# Patient Record
Sex: FEMALE | Race: OTHER | URBAN - METROPOLITAN AREA
[De-identification: names, ages, dates, MRNs, and addresses within clinical notes are randomized per-mention and may not be internally consistent; named-entity substitution may affect disease eponyms.]

---

## 2017-02-21 ENCOUNTER — TELEPHONE (OUTPATIENT)
Dept: OBGYN CLINIC | Facility: CLINIC | Age: 22
End: 2017-02-21

## 2017-02-21 NOTE — TELEPHONE ENCOUNTER
Pt saw Fritz Lala on 8/15/16 for annual exam and was given refill of OCP Loestrin. Pt calling today to report that YESIKA briefly discussed IUDs with pt at time of annual exam appt and pt stated she is interesting in getting IUD placed.  Pt stated that she cannot rem

## 2017-02-21 NOTE — TELEPHONE ENCOUNTER
W/LIKE IUD;AS DISCUSSED AT LAST LEONOR'T  MENSES DO THIS WEEK, PT ON BC  CAN ONLY DO EARLY MORNING  OK TO LV VM

## 2017-02-21 NOTE — TELEPHONE ENCOUNTER
Need to review all options of IUD -- new one available now also.  Come in for 10 min discussion first since over 6 months ago since last discussion

## 2017-02-21 NOTE — TELEPHONE ENCOUNTER
Called pt back to ask when she is due for her next period. Pt stated she is due Thursday or Friday of this week and pt stated that lately her periods are short, about 2 days. Message to [address] to please review message below and give recs.

## 2017-03-04 ENCOUNTER — OFFICE VISIT (OUTPATIENT)
Dept: OBGYN CLINIC | Facility: CLINIC | Age: 22
End: 2017-03-04

## 2017-03-04 ENCOUNTER — TELEPHONE (OUTPATIENT)
Dept: OBGYN CLINIC | Facility: CLINIC | Age: 22
End: 2017-03-04

## 2017-03-04 VITALS
BODY MASS INDEX: 30 KG/M2 | SYSTOLIC BLOOD PRESSURE: 121 MMHG | WEIGHT: 170 LBS | DIASTOLIC BLOOD PRESSURE: 81 MMHG | HEART RATE: 74 BPM

## 2017-03-04 DIAGNOSIS — Z30.09 BIRTH CONTROL COUNSELING: Primary | ICD-10-CM

## 2017-03-04 DIAGNOSIS — Z30.430 ENCOUNTER FOR INSERTION OF INTRAUTERINE CONTRACEPTIVE DEVICE: Primary | ICD-10-CM

## 2017-03-04 PROCEDURE — 99213 OFFICE O/P EST LOW 20 MIN: CPT | Performed by: OBSTETRICS & GYNECOLOGY

## 2017-03-04 NOTE — PROGRESS NOTES
Cheyenne Goldsmith is a 24year old female [facility] Patient's last menstrual period was 02/23/2017. Patient presents with:  Consult: IUD Consult -- had issues w/ insurance re: ocp coverage thus wants IUD instead.  Less cramps & lighter periods w/ ocps but put ma developed, well nourished  Head/Face: normocephalic  Psychiatric:  Oriented to time, place, person and situation. Appropriate mood and affect    Assessment & Plan:  Kt Licona was seen today for consult.     Diagnoses and all orders for this visit:    Birth con

## 2017-03-19 ENCOUNTER — TELEPHONE (OUTPATIENT)
Dept: OBGYN CLINIC | Facility: CLINIC | Age: 22
End: 2017-03-19

## 2017-03-19 RX ORDER — MISOPROSTOL 200 UG/1
TABLET ORAL
Qty: 2 TABLET | Refills: 0 | Status: SHIPPED | OUTPATIENT
Start: 2017-03-19

## 2017-03-19 NOTE — TELEPHONE ENCOUNTER
Pt called to due to she is to have IUD inserted tomorrow evening and there is no cytotec at pharmacy for her, erx sent.

## 2017-03-20 ENCOUNTER — OFFICE VISIT (OUTPATIENT)
Dept: OBGYN CLINIC | Facility: CLINIC | Age: 22
End: 2017-03-20

## 2017-03-20 VITALS — SYSTOLIC BLOOD PRESSURE: 119 MMHG | DIASTOLIC BLOOD PRESSURE: 78 MMHG | HEART RATE: 69 BPM

## 2017-03-20 DIAGNOSIS — Z30.430 ENCOUNTER FOR INSERTION OF INTRAUTERINE CONTRACEPTIVE DEVICE: Primary | ICD-10-CM

## 2017-03-20 LAB
CONTROL LINE PRESENT WITH A CLEAR BACKGROUND (YES/NO): YES YES/NO
KIT LOT #: NORMAL NUMERIC
PREGNANCY TEST, URINE: NEGATIVE

## 2017-03-20 PROCEDURE — 81025 URINE PREGNANCY TEST: CPT | Performed by: OBSTETRICS & GYNECOLOGY

## 2017-03-20 PROCEDURE — 58300 INSERT INTRAUTERINE DEVICE: CPT | Performed by: OBSTETRICS & GYNECOLOGY

## 2017-03-21 NOTE — PROCEDURES
IUD Insertion     Pregnancy Results: negative from urine test   Birth control method(s) used:    LMP: 3/19/17    Consent signed. Procedure discussed with the patient in detail including indication, risks, benefits, alternatives and complications.     Pelvi

## (undated) NOTE — Clinical Note
AUTHORIZATION FOR SURGICAL OPERATION OR OTHER PROCEDURE    1.  I hereby authorize Dr. Jose Decker and [facility] staff assigned to my case to perform the following operation and/or procedure at the [facility]:    IUD Anshu Mandujano      __ _____DAVIS,MAEGAN_________________________________________________  (please print)      Patient signature:  ___________________________________________________             Relationship to Patient:             Parent    Responsible person

## (undated) NOTE — MR AVS SNAPSHOT
[facility]  [address]  [phone]               Thank you for choosing us for your health care visit with Tom Antoine MD.  We are glad to serve you and happy to provide you with this summar Call [phone] for help. [facility] is NOT to be used for urgent needs. For medical emergencies, dial 911.         Educational Information     Healthy Diet and Regular Exercise  [employer] for making healthy food choices  -

## (undated) NOTE — MR AVS SNAPSHOT
[facility]  [address]  [phone]               Thank you for choosing us for your health care visit with Queen Sania MD.  We are glad to serve you and happy to provide you with this summar discharge instructions in [facility] by going to Visits < Admission Summaries. If you've been to the Emergency Department or your doctor's office, you can view your past visit information in [facility] by going to Visits < Visit Summaries. [place] questions?